# Patient Record
Sex: FEMALE | Race: WHITE
[De-identification: names, ages, dates, MRNs, and addresses within clinical notes are randomized per-mention and may not be internally consistent; named-entity substitution may affect disease eponyms.]

---

## 2019-06-04 ENCOUNTER — HOSPITAL ENCOUNTER (EMERGENCY)
Dept: HOSPITAL 25 - UCEAST | Age: 34
Discharge: HOME | End: 2019-06-04
Payer: COMMERCIAL

## 2019-06-04 VITALS — DIASTOLIC BLOOD PRESSURE: 46 MMHG | SYSTOLIC BLOOD PRESSURE: 123 MMHG

## 2019-06-04 DIAGNOSIS — F17.200: ICD-10-CM

## 2019-06-04 DIAGNOSIS — J45.909: ICD-10-CM

## 2019-06-04 DIAGNOSIS — T78.05XA: Primary | ICD-10-CM

## 2019-06-04 PROCEDURE — 99212 OFFICE O/P EST SF 10 MIN: CPT

## 2019-06-04 PROCEDURE — G0463 HOSPITAL OUTPT CLINIC VISIT: HCPCS

## 2019-06-04 NOTE — UC
Allergic Reaction HPI





- HPI Summary


HPI Summary: 





34 yo female ate dish with walnuts


As she was eating it she developed lip swelling/tightness of throat and 

wheezing.


Her face felt itchy


she did not develop a rash





borrowed a friends inhaler and used it with good results





took benadry





currently some mild left periorbital edema ...all other symptoms have resolved














- History of Current Complaint


Chief Complaint: UCAllergicReaction


Stated Complaint: ALLERGIC REACTION


Time Seen by Provider: 06/04/19 14:32


Hx Obtained From: Patient


Hx Last Menstrual Period: 5/24/19


Onset/Duration: Sudden Onset, Lasting Hours


Severity Initially: Severe


Severity Currently: Mild


Pain Intensity: 1


Pain Scale Used: 0-10 Numeric


Character: Swelling, Pruritus


Aggravating Factor(s): Nothing


Alleviating Factor(s): Antihistamines, Other - inhlaer


Associated Signs And Symptoms: Positive: Cough Wheezing - resolved, Difficulty 

Breathing - redsolved, Hoarseness - resolved, Throat Tightening - resolved.  

Negative: Abdominal Pain, Chest Pain, Diaphoresis, Lightheadedness, Nausea, Rash

, Syncope, Vomiting





- Related Hx


Possible Reaction To: Food





- Allergies/Home Medications


Allergies/Adverse Reactions: 


 Allergies











Allergy/AdvReac Type Severity Reaction Status Date / Time


 


walnut Allergy  Swelling Verified 06/04/19 14:15





   Of  





   Face,Lips,&  





   Throat  


 


environmental Allergy  Hives Uncoded 06/04/19 14:15











Home Medications: 


 Home Medications





Adrenal Supplement 1 tab PO DAILY 06/04/19 [History Confirmed 06/04/19]


Albuterol Sulfate [Ventolin Hfa] 2 puff INH ONCE PRN 06/04/19 [History 

Confirmed 06/04/19]


Lactase [Lactaid] 1 tab PO AC PRN 06/04/19 [History Confirmed 06/04/19]


Turmeric Root Extract [Turmeric] 1 tab PO DAILY 06/04/19 [History Confirmed 06/ 04/19]


diphenhydrAMINE HCl [Benadryl Allergy] 60 ml PO Q4HR PRN 06/04/19 [History 

Confirmed 06/04/19]











PMH/Surg Hx/FS Hx/Imm Hx


Previously Healthy: Yes


Respiratory History: Asthma





- Surgical History


Surgical History: None





- Family History


Known Family History: Positive: Hypertension, Respiratory Disease





- Social History


Alcohol Use: Occasionally


Substance Use Type: None


Smoking Status (MU): Light Every Day Tobacco Smoker


Household Exposure Type: Cigarettes





- Immunization History


Most Recent Tetanus Shot: tdap within last 2 years





Review of Systems


All Other Systems Reviewed And Are Negative: Yes


Constitutional: Positive: Negative


Skin: Positive: Negative


Eyes: Positive: Negative


ENT: Positive: Negative


Respiratory: Positive: Negative


Cardiovascular: Positive: Negative


Gastrointestinal: Positive: Negative


Genitourinary: Positive: Negative


Motor: Positive: Negative


Neurovascular: Positive: Negative


Musculoskeletal: Positive: Negative


Neurological: Positive: Negative


Psychological: Positive: Negative





Physical Exam


Triage Information Reviewed: Yes


Appearance: Well-Appearing, No Pain Distress, Well-Nourished


Vital Signs: 


 Initial Vital Signs











Temp  99.3 F   06/04/19 14:06


 


Pulse  90   06/04/19 14:06


 


Resp  20   06/04/19 14:06


 


BP  123/46   06/04/19 14:06


 


Pulse Ox  100   06/04/19 14:06











Vital Signs Reviewed: Yes


Eyes: Positive: Conjunctiva Clear


ENT: Positive: Hearing grossly normal, Uvula midline, Other - no stridor/no lip 

or tongue edema.  Negative: Pharyngeal erythema, Nasal congestion, Nasal 

drainage, Trismus, Muffled voice, Hoarse voice, Dental tenderness, Sinus 

tenderness


Dental Exam: Normal


Neck: Positive: Supple, Nontender, No Lymphadenopathy


Respiratory: Positive: Lungs clear, Normal breath sounds, No respiratory 

distress


Cardiovascular: Positive: RRR, No Murmur


Abdomen Description: Positive: No Organomegaly, Soft


Bowel Sounds: Positive: Present


Musculoskeletal: Positive: Strength Intact, ROM Intact


Neurological: Positive: Alert


Psychological Exam: Normal


Skin Exam: Normal





Allergic Reaction Course/Dx





- Differential Dx/Diagnosis


Provider Diagnosis: 


 Anaphylactic reaction due to tree nuts and seeds, initial encounter








Discharge





- Sign-Out/Discharge


Documenting (check all that apply): Patient Departure


All imaging exams completed and their final reports reviewed: No Studies





- Discharge Plan


Condition: Stable


Disposition: HOME


Prescriptions: 


Albuterol HFA INHALER* [Ventolin HFA Inhaler*] 2 puff INH QID #1 mdi


EPINEPHrine [Epipen 2-Dave] 0.3 mg IM ONCE PRN #1 inj


 PRN Reason: Allergy Symptoms


predniSONE [Deltasone 20 MG TAB] 40 mg PO DAILY #8 tab


Patient Education Materials:  Food Allergy (ED), Anaphylaxis (ED)


Referrals: 


Papa Kerr MD [Medical Doctor] - As Soon As Possible


Kirby Beck MD [Primary Care Provider] - If Needed


Additional Instructions: 


take benadryl if needed for itching





avoid tree nuts





use epipen if you have a severe reaction like you had last pm... if you have to 

use it go to the ER after the injection








use inhaler if need for wheezing











- Billing Disposition and Condition


Condition: STABLE


Disposition: Home